# Patient Record
Sex: MALE | Race: WHITE
[De-identification: names, ages, dates, MRNs, and addresses within clinical notes are randomized per-mention and may not be internally consistent; named-entity substitution may affect disease eponyms.]

---

## 2017-12-20 ENCOUNTER — HOSPITAL ENCOUNTER (EMERGENCY)
Dept: HOSPITAL 74 - JERFT | Age: 27
Discharge: HOME | End: 2017-12-20
Payer: COMMERCIAL

## 2017-12-20 VITALS — DIASTOLIC BLOOD PRESSURE: 70 MMHG | HEART RATE: 83 BPM | TEMPERATURE: 99.1 F | SYSTOLIC BLOOD PRESSURE: 125 MMHG

## 2017-12-20 VITALS — BODY MASS INDEX: 23.3 KG/M2

## 2017-12-20 DIAGNOSIS — J02.9: Primary | ICD-10-CM

## 2017-12-20 DIAGNOSIS — H66.002: ICD-10-CM

## 2017-12-20 NOTE — PDOC
History of Present Illness





- General


Chief Complaint: Sore Throat


Stated Complaint: FATIGUE


Time Seen by Provider: 12/20/17 16:56





Past History





- Past Medical History


Allergies/Adverse Reactions: 


 Allergies











Allergy/AdvReac Type Severity Reaction Status Date / Time


 


No Known Allergies Allergy   Verified 12/20/17 16:58











Home Medications: 


Ambulatory Orders





Amoxicillin - [Amoxicillin 500mg Capsule -] 500 mg PO BID #14 capsule 12/20/17 


Ibuprofen 800 mg PO TID #30 tablet 12/20/17 








COPD: No





- Suicide/Smoking/Psychosocial Hx


Smoking History: Current every day smoker


Have you smoked in the past 12 months: Yes


Number of Cigarettes Smoked Daily: 5


Information on smoking cessation initiated: No


'Breaking Loose' booklet given: 05/15/15


Hx Alcohol Use: Yes (SOCIAL)


Drug/Substance Use Hx: No


Substance Use Type: None


Hx Substance Use Treatment: No





*Physical Exam





- Vital Signs


 Last Vital Signs











Temp Pulse Resp BP Pulse Ox


 


 99.1 F   83   20   125/70   99 


 


 12/20/17 16:55  12/20/17 16:55  12/20/17 16:55  12/20/17 16:55  12/20/17 16:55














ED Treatment Course





- ADDITIONAL ORDERS


Additional order review: 














 12/20/17 17:00 Group A Strep Rapid Antigen - Final





 Throat 














- Medications


Given in the ED: 


ED Medications














Discontinued Medications














Generic Name Dose Route Start Last Admin





  Trade Name Xenia  PRN Reason Stop Dose Admin


 


Ibuprofen  600 mg  12/20/17 16:58  12/20/17 17:29





  Motrin -  PO  12/20/17 16:59  600 mg





  ONCE ONE   Administration














*DC/Admit/Observation/Transfer


Diagnosis at time of Disposition: 


Pharyngitis


Qualifiers:


 Pharyngitis/tonsillitis etiology: unspecified etiology Qualified Code(s): 

J02.9 - Acute pharyngitis, unspecified





Otitis media


Qualifiers:


 Otitis media type: suppurative Chronicity: acute Laterality: left Recurrence: 

not specified as recurrent Spontaneous tympanic membrane rupture: without 

spontaneous rupture Qualified Code(s): H66.002 - Acute suppurative otitis media 

without spontaneous rupture of ear drum, left ear








- Discharge Dispostion


Disposition: HOME


Condition at time of disposition: Good


Admit: No





- Referrals


Referrals: 


Angie Hannon MD [Staff Physician] - 





- Patient Instructions


Printed Discharge Instructions:  DI for Pharyngitis/Tonsillopharyngitis -- Adult

, Middle Ear Infection


Additional Instructions: 


Your Strep test was negative today. However, you do have an ear infection on 

exam. Please take the antibiotics as prescribed. You were prescribed 

amoxicillin. You may take ibuprofen as needed for pain. Do not exceed 3,000mg a 

day. Drink plenty of fluids and follow up with your primary doctor in one week.





Return to the ED if you have worsening pain, fevers, chills, hearing changes, 

difficulty swallowing, or any changes in your symptoms





- Post Discharge Activity


Forms/Work/School Notes:  Back to Work

## 2017-12-20 NOTE — PDOC
Rapid Medical Evaluation


Time Seen by Provider: 12/20/17 16:56


Medical Evaluation: 


 Allergies











Allergy/AdvReac Type Severity Reaction Status Date / Time


 


No Known Allergies Allergy   Verified 09/17/16 18:17











12/20/17 16:56


I have performed a brief in person evaluation of this patient. 


The patient presents with chief complaint of : sore throat for one week 


Pertinent PE findings: erythema to pharynx


I have ordered the following: rapid strep 


The patient will proceed to the ER for further evaluation.

## 2018-01-08 ENCOUNTER — HOSPITAL ENCOUNTER (EMERGENCY)
Dept: HOSPITAL 74 - JERFT | Age: 28
Discharge: HOME | End: 2018-01-08
Payer: COMMERCIAL

## 2018-01-08 VITALS — BODY MASS INDEX: 23.3 KG/M2

## 2018-01-08 VITALS — DIASTOLIC BLOOD PRESSURE: 51 MMHG | TEMPERATURE: 99.3 F | SYSTOLIC BLOOD PRESSURE: 121 MMHG | HEART RATE: 85 BPM

## 2018-01-08 DIAGNOSIS — J01.90: Primary | ICD-10-CM

## 2018-01-08 NOTE — PDOC
Rapid Medical Evaluation


Time Seen by Provider: 01/08/18 15:10


Medical Evaluation: 


 Allergies











Allergy/AdvReac Type Severity Reaction Status Date / Time


 


No Known Allergies Allergy   Verified 01/08/18 15:08











01/08/18 15:10


Pt presents to the ED: uri s/s intermittently x 3 weeks, no fever


Pt on brief exam: no pharyngeal erythema, vss, lcta


Pt ordered for: none


Pt to proceed to the ED





**Discharge Disposition





- Diagnosis


 Nasal congestion








- Referrals





- Patient Instructions





- Post Discharge Activity

## 2018-01-08 NOTE — PDOC
History of Present Illness





- General


Chief Complaint: Cold Symptoms


Stated Complaint: HEADACHES, THROAT PAIN


Time Seen by Provider: 01/08/18 15:10





- History of Present Illness


Initial Comments: 





01/08/18 15:52


CHIEF COMPLAINT:  cough, runny nose, sore throat 





HISTORY OF PRESENT ILLNESS: 29 yo M with no significant PMH presents to [facility] 

[facility] with cold symptoms x 1 month.  Patient reports that he was seen here and 

given amoxicillin, which he took for two days but then stopped "because I felt 

better, and it made my stomach feel weird."  He denies any fever, chills, 

vomiting, or diarrhea.  





PAST MEDICAL HISTORY: Denies past medical history





FAMILY HISTORY: Denies





SOCIAL HISTORY:Denies tobacco, alcohol, illicit drug use. 





SURGICAL HISTORY: Denies





ALLERGIES: No known drug allergies





REVIEW OF SYSTEMS


as per HPI








PHYSICAL EXAM


General Appearance: Well-appearing, appropriately dressed.  No apparent 

distress.





HEENT: Rhinorrhea, post nasal drip, nasal congestion. EOMI, PERRLA.  No 

conjunctival pallor.  No photophobia, scleral icterus.





Respiratory/Chest: Lungs CTAB. 





Cardiovascular: RRR. S1, S2.  


Gastrointestinal/Abdominal: Normal bowel sounds.  Abdomen soft, non-distended.  

No tenderness or rebound tenderness. No  organomegaly, pulsatile mass, guarding

, hernia, hepatomegaly, splenomegaly.





Musculoskeletal/Extremities:  Normal inspection. FROM of all extremities, 

normal capillary refill.  Pelvis Stable.  No CVA tenderness. No tenderness to 

extremities, pedal edema, swelling, erythema or deformity.





Integumentary: Appropriate color, dry, warm.  No cyanosis, erythema, jaundice 

or rash





Neurologic: CNs II-XII intact. Fully oriented, alert.  Appropriate mood/affect. 

Motor strength 5/5.  No appreciable EOM palsy, facial droop or sensory deficit.





Past History





- Past Medical History


Allergies/Adverse Reactions: 


 Allergies











Allergy/AdvReac Type Severity Reaction Status Date / Time


 


No Known Allergies Allergy   Verified 01/08/18 15:08











Home Medications: 


Ambulatory Orders





Azithromycin [Zithromax 250mg Tablets -] 250 mg PO UTDICT #6 tab 01/08/18 


Benzonatate [Tessalon Pearls -] 100 mg PO TID PRN #21 capsule 01/08/18 








COPD: No


Other medical history: DENIES





- Suicide/Smoking/Psychosocial Hx


Smoking History: Current every day smoker


Have you smoked in the past 12 months: Yes


Number of Cigarettes Smoked Daily: 7


Information on smoking cessation initiated: No


'Breaking Loose' booklet given: 05/15/15


Hx Alcohol Use: Yes (SOCIAL)


Drug/Substance Use Hx: No


Substance Use Type: None


Hx Substance Use Treatment: No





*Physical Exam





- Vital Signs


 Last Vital Signs











Temp Pulse Resp BP Pulse Ox


 


 99.3 F   85   19   121/51   96 


 


 01/08/18 15:08  01/08/18 15:08  01/08/18 15:08  01/08/18 15:08  01/08/18 15:08














Medical Decision Making





- Medical Decision Making





01/08/18 16:01


29 yo M with no significant PMH presents to [facility] [employer] [facility] with cold symptoms x 1 

month.





-flu swab





-tessalon perles, zpack 





*DC/Admit/Observation/Transfer


Diagnosis at time of Disposition: 


 Nasal congestion





Sinusitis


Qualifiers:


 Sinusitis location: unspecified location Chronicity: acute Recurrence: not 

specified as recurrent Qualified Code(s): J01.90 - Acute sinusitis, unspecified








- Discharge Dispostion


Disposition: HOME


Condition at time of disposition: Stable


Admit: No





- Prescriptions


Prescriptions: 


Azithromycin [Zithromax 250mg Tablets -] 250 mg PO UTDICT #6 tab


Benzonatate [Tessalon Pearls -] 100 mg PO TID PRN #21 capsule


 PRN Reason: Cough





- Referrals


Referrals: 


Jeane Cortés MD [Staff Physician] - 





- Patient Instructions


Printed Discharge Instructions:  DI for Sinusitis


Additional Instructions: 


Please take medications as prescribed.  Follow up with a primary care doctor if 

symptoms persist past 3-5 days.  If you develop fever, chills, vomiting, 

diarrhea, or any new or worsening symptoms, please return to the ER. 





- Post Discharge Activity

## 2018-09-06 ENCOUNTER — HOSPITAL ENCOUNTER (EMERGENCY)
Dept: HOSPITAL 74 - JERFT | Age: 28
Discharge: HOME | End: 2018-09-06
Payer: COMMERCIAL

## 2018-09-06 VITALS — SYSTOLIC BLOOD PRESSURE: 119 MMHG | DIASTOLIC BLOOD PRESSURE: 73 MMHG | TEMPERATURE: 98.1 F | HEART RATE: 66 BPM

## 2018-09-06 VITALS — BODY MASS INDEX: 24.2 KG/M2

## 2018-09-06 DIAGNOSIS — R04.0: Primary | ICD-10-CM

## 2018-09-06 LAB
BASOPHILS # BLD: 0.9 % (ref 0–2)
DEPRECATED RDW RBC AUTO: 13.2 % (ref 11.9–15.9)
EOSINOPHIL # BLD: 6.3 % (ref 0–4.5)
HCT VFR BLD CALC: 40.9 % (ref 35.4–49)
HGB BLD-MCNC: 14.3 GM/DL (ref 11.7–16.9)
LYMPHOCYTES # BLD: 32.6 % (ref 8–40)
MCH RBC QN AUTO: 31.8 PG (ref 25.7–33.7)
MCHC RBC AUTO-ENTMCNC: 34.9 G/DL (ref 32–35.9)
MCV RBC: 91.2 FL (ref 80–96)
MONOCYTES # BLD AUTO: 7.3 % (ref 3.8–10.2)
NEUTROPHILS # BLD: 52.9 % (ref 42.8–82.8)
PLATELET # BLD AUTO: 241 K/MM3 (ref 134–434)
PMV BLD: 8.4 FL (ref 7.5–11.1)
RBC # BLD AUTO: 4.48 M/MM3 (ref 4–5.6)
WBC # BLD AUTO: 7.6 K/MM3 (ref 4–10)

## 2018-09-16 ENCOUNTER — HOSPITAL ENCOUNTER (EMERGENCY)
Dept: HOSPITAL 74 - JERFT | Age: 28
Discharge: HOME | End: 2018-09-16
Payer: COMMERCIAL

## 2018-09-16 VITALS — SYSTOLIC BLOOD PRESSURE: 122 MMHG | TEMPERATURE: 98.4 F | DIASTOLIC BLOOD PRESSURE: 80 MMHG | HEART RATE: 74 BPM

## 2018-09-16 VITALS — BODY MASS INDEX: 23.3 KG/M2

## 2018-09-16 DIAGNOSIS — F17.210: ICD-10-CM

## 2018-09-16 DIAGNOSIS — H60.501: ICD-10-CM

## 2018-09-16 DIAGNOSIS — J06.9: ICD-10-CM

## 2018-09-16 DIAGNOSIS — J01.90: Primary | ICD-10-CM

## 2018-09-16 NOTE — PDOC
History of Present Illness





- General


Chief Complaint: Ear Problem


Stated Complaint: COLD SYMPTOMS


Time Seen by Provider: 09/16/18 16:33


History Source: Patient


Exam Limitations: Clinical Condition





- History of Present Illness


Initial Comments: 





09/16/18 16:58


Patient with no sig past medical history present with complain of 1 week 

history of right ear pain, nasal congestion, headache, throat discomfort and 

sinus pain. Denies fever, cough, nausea or vomiting. Denies painful to swallow 

now. Denies any other symptoms


Timing/Duration: 1 week





Past History





- Past Medical History


Allergies/Adverse Reactions: 


 Allergies











Allergy/AdvReac Type Severity Reaction Status Date / Time


 


No Known Allergies Allergy   Verified 09/16/18 16:46











Home Medications: 


Ambulatory Orders





Azithromycin [Zithromax Tri-Amaury (3 DAYS) -] 500 mg PO DAILY #3 tablet 09/16/18 


Ipratropium Bromide 2 spray NS BID PRN #1 spray 09/16/18 


Neomycin/Polymyxin B Sulf/Hc [Neomycin-Polymyxin-Hc Ear Susp] 4 drop OT TID 5 

Days #1 bottle 09/16/18 








Cardiac Disorders: No


CVA: No


COPD: No


DVT: No





- Suicide/Smoking/Psychosocial Hx


Smoking History: Current some day smoker


Have you smoked in the past 12 months: Yes


Number of Cigarettes Smoked Daily: 10


Information on smoking cessation initiated: Yes


'Breaking Loose' booklet given: 05/15/15


Hx Alcohol Use: No


Drug/Substance Use Hx: No


Substance Use Type: None


Hx Substance Use Treatment: No





**Review of Systems





- Review of Systems


Able to Perform ROS?: Yes


Is the patient limited English proficient: No


Constitutional: No: Chills, Diaphoresis, Fever, Malaise, Night Sweats, Weakness


HEENTM: Yes: See HPI, Ear Pain (right ear), Nose Congestion.  No: Eye Pain, 

Blurred Vision, Tearing, Recent change in vision, Double Vision, Cataracts, 

Ocular Prothesis, Ear Discharge, Nose Pain, Tinnitus, Nose Bleeding, Hearing 

Loss, Throat Pain, Throat Swelling, Mouth Pain, Dental Problems, Difficulty 

Swallowing, Mouth Swelling, Other


Respiratory: No: Cough, Orthopnea, Shortness of Breath, SOB with Exertion, SOB 

at Rest, Stridor, Wheezing, Productive cough, Hemoptysis, Other


Cardiac (ROS): No: Chest Pain, Edema, Irregular Heart Rate, Lightheadedness, 

Palpitations, Syncope, Chest Tightness, Other


ABD/GI: No: Abdominal Distended, Abd. Pain w/ defecation, Blood Streaked Bowels

, Constipated, Diarrhea, Difficulty Swallowing, Nausea, Poor Appetite, Poor 

Fluid Intake, Rectal Bleeding, Vomiting, Indigestion, Abdominal cramping, Tarry 

Stools, Other


All Other Systems: Reviewed and Negative





*Physical Exam





- Vital Signs


 Last Vital Signs











Temp Pulse Resp BP Pulse Ox


 


 98.4 F   74   20   122/80   97 


 


 09/16/18 16:17  09/16/18 16:17  09/16/18 16:17  09/16/18 16:17  09/16/18 16:17














- Physical Exam


Comments: 





09/16/18 17:04


GENERAL:


Well developed, well nourished. Awake and alert. No acute distress.


HEENT:  Moderate erythema in right external ear canal. Left ear canal normal. 

Tympanic membranes normal bilaterally. Normocephalic, atraumatic. PERRLA, EOMI. 

No conjunctival pallor. Sclera are non-icteric. Moist mucous membranes. 

Oropharynx is clear.


NECK: 


Supple. Full ROM. 


CARDIOVASCULAR:


Regular rate and rhythm. No murmurs, rubs, or gallops. Distal pulses are 2+ and 

symmetric. 


PULMONARY: 


No evidence of respiratory distress. Lungs clear to auscultation bilaterally. 

No wheezing, rales or rhonchi.


ABDOMINAL:


Soft. Non-tender. Non-distended. No rebound or guarding. No organomegaly. 

Normoactive bowel sounds. 


MUSCULOSKELETAL 


Normal range of motion at all joints. 


EXTREMITIES: 


No cyanosis. No clubbing. No edema. No calf tenderness.


SKIN: 


Warm and dry. Normal capillary refill. No rashes. No jaundice. 


NEUROLOGICAL: 


Alert, awake, appropriate.  Gait is normal without ataxia.


PSYCHIATRIC: 


Cooperative. Good eye contact. Appropriate mood 


General Appearance: Yes: Nourished, Appropriately Dressed.  No: Apparent 

Distress





Medical Decision Making





- Medical Decision Making





09/16/18 16:57


Patient with no sig past medical history present with complain of 1 week 

history of right ear pain, nasal congestion, headache, throat discomfort and 

sinus pain.


09/16/18 16:58


Exam significant for nasal congestion and moderate erythema in right ear canal. 

Patient will be treated antibiotics eardrops, nasal spray and Zithromax 

antibiotics with ENT follow-up





*DC/Admit/Observation/Transfer


Diagnosis at time of Disposition: 


Sinusitis


Qualifiers:


 Sinusitis location: unspecified location Chronicity: acute Recurrence: non-

recurrent Qualified Code(s): J01.90 - Acute sinusitis, unspecified





Otitis externa


Qualifiers:


 Otitis externa type: unspecified type Chronicity: acute Laterality: right 

Qualified Code(s): H60.501 - Unspecified acute noninfective otitis externa, 

right ear





URI (upper respiratory infection)


Qualifiers:


 URI type: unspecified URI Qualified Code(s): J06.9 - Acute upper respiratory 

infection, unspecified








- Discharge Dispostion


Disposition: HOME


Condition at time of disposition: Stable


Decision to Admit order: No





- Prescriptions


Prescriptions: 


Azithromycin [Zithromax Tri-Amaury (3 DAYS) -] 500 mg PO DAILY #3 tablet


Ipratropium Bromide 2 spray NS BID PRN #1 spray


 PRN Reason: nasal congestion


Neomycin/Polymyxin B Sulf/Hc [Neomycin-Polymyxin-Hc Ear Susp] 4 drop OT TID 5 

Days #1 bottle





- Referrals


Referrals: 


Song Willis MD [Staff Physician] - 





- Patient Instructions


Printed Discharge Instructions:  DI for Otitis Externa


Additional Instructions: 


Take prescribed medication as prescribed. Follow-up with referred ENT if 

symptoms persist for more than 4 days





- Post Discharge Activity

## 2019-07-30 ENCOUNTER — HOSPITAL ENCOUNTER (EMERGENCY)
Dept: HOSPITAL 74 - JER | Age: 29
LOS: 1 days | Discharge: LEFT BEFORE DISCHARGE | End: 2019-07-31
Payer: COMMERCIAL

## 2019-07-30 VITALS — DIASTOLIC BLOOD PRESSURE: 70 MMHG | SYSTOLIC BLOOD PRESSURE: 112 MMHG | HEART RATE: 86 BPM | TEMPERATURE: 98.7 F

## 2019-07-30 VITALS — BODY MASS INDEX: 23.3 KG/M2

## 2019-07-30 DIAGNOSIS — S21.112A: Primary | ICD-10-CM
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
## 2019-07-30 DIAGNOSIS — S21.212A: ICD-10-CM
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
## 2019-07-30 PROCEDURE — 3E0234Z INTRODUCTION OF SERUM, TOXOID AND VACCINE INTO MUSCLE, PERCUTANEOUS APPROACH: ICD-10-PCS
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
## 2020-02-07 ENCOUNTER — HOSPITAL ENCOUNTER (EMERGENCY)
Dept: HOSPITAL 74 - JER | Age: 30
Discharge: HOME | End: 2020-02-07
Payer: COMMERCIAL

## 2020-02-07 VITALS — TEMPERATURE: 98.4 F

## 2020-02-07 VITALS — HEART RATE: 106 BPM | SYSTOLIC BLOOD PRESSURE: 102 MMHG | DIASTOLIC BLOOD PRESSURE: 74 MMHG

## 2020-02-07 VITALS — BODY MASS INDEX: 22.6 KG/M2

## 2020-02-07 DIAGNOSIS — S31.119A: Primary | ICD-10-CM
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
## 2020-02-07 LAB
ALBUMIN SERPL-MCNC: 4.5 G/DL (ref 3.4–5)
ALP SERPL-CCNC: 54 U/L (ref 45–117)
ALT SERPL-CCNC: 51 U/L (ref 13–61)
ANION GAP SERPL CALC-SCNC: 9 MMOL/L (ref 8–16)
AST SERPL-CCNC: 30 U/L (ref 15–37)
BASOPHILS # BLD: 0.8 % (ref 0–2)
BILIRUB SERPL-MCNC: 0.2 MG/DL (ref 0.2–1)
BUN SERPL-MCNC: 8.1 MG/DL (ref 7–18)
CALCIUM SERPL-MCNC: 9.6 MG/DL (ref 8.5–10.1)
CHLORIDE SERPL-SCNC: 104 MMOL/L (ref 98–107)
CO2 SERPL-SCNC: 25 MMOL/L (ref 21–32)
CREAT SERPL-MCNC: 1 MG/DL (ref 0.55–1.3)
DEPRECATED RDW RBC AUTO: 13.5 % (ref 11.9–15.9)
EOSINOPHIL # BLD: 4.8 % (ref 0–4.5)
GLUCOSE SERPL-MCNC: 127 MG/DL (ref 74–106)
HCT VFR BLD CALC: 43.3 % (ref 35.4–49)
HGB BLD-MCNC: 15.3 GM/DL (ref 11.7–16.9)
INR BLD: 0.96 (ref 0.83–1.09)
LYMPHOCYTES # BLD: 23.9 % (ref 8–40)
MCH RBC QN AUTO: 32.6 PG (ref 25.7–33.7)
MCHC RBC AUTO-ENTMCNC: 35.3 G/DL (ref 32–35.9)
MCV RBC: 92.5 FL (ref 80–96)
MONOCYTES # BLD AUTO: 5.4 % (ref 3.8–10.2)
NEUTROPHILS # BLD: 65.1 % (ref 42.8–82.8)
PLATELET # BLD AUTO: 284 K/MM3 (ref 134–434)
PMV BLD: 8.5 FL (ref 7.5–11.1)
POTASSIUM SERPLBLD-SCNC: 3.4 MMOL/L (ref 3.5–5.1)
PROT SERPL-MCNC: 8.2 G/DL (ref 6.4–8.2)
PT PNL PPP: 11.3 SEC (ref 9.7–13)
RBC # BLD AUTO: 4.68 M/MM3 (ref 4–5.6)
SODIUM SERPL-SCNC: 138 MMOL/L (ref 136–145)
WBC # BLD AUTO: 9.2 K/MM3 (ref 4–10)

## 2020-02-07 PROCEDURE — 3E0234Z INTRODUCTION OF SERUM, TOXOID AND VACCINE INTO MUSCLE, PERCUTANEOUS APPROACH: ICD-10-PCS

## 2020-02-07 PROCEDURE — 0HQ7XZZ REPAIR ABDOMEN SKIN, EXTERNAL APPROACH: ICD-10-PCS

## 2020-02-07 NOTE — EKG
Test Reason : 

Blood Pressure : ***/*** mmHG

Vent. Rate : 114 BPM     Atrial Rate : 114 BPM

   P-R Int : 154 ms          QRS Dur : 086 ms

    QT Int : 330 ms       P-R-T Axes : [phone] degrees

   QTc Int : 454 ms

 

SINUS TACHYCARDIA

RIGHT ATRIAL ENLARGEMENT

MINIMAL VOLTAGE CRITERIA FOR LVH, MAY BE NORMAL VARIANT

WHEN COMPARED WITH ECG OF 09-MAY-2015 12:17,

NO SIGNIFICANT CHANGE WAS FOUND

Confirmed by AUGUSTA CUELLO MD ([id number]) on 2/7/2020 12:23:07 PM

 

Referred By:             Confirmed By:AUGUSTA CUELLO MD
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
## 2020-02-17 ENCOUNTER — HOSPITAL ENCOUNTER (EMERGENCY)
Dept: HOSPITAL 74 - JER | Age: 30
Discharge: HOME | End: 2020-02-17
Payer: COMMERCIAL

## 2020-02-17 DIAGNOSIS — Z48.817: Primary | ICD-10-CM

## 2020-02-17 DIAGNOSIS — Z48.02: ICD-10-CM
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
